# Patient Record
Sex: MALE | Race: WHITE | NOT HISPANIC OR LATINO | Employment: OTHER | URBAN - METROPOLITAN AREA
[De-identification: names, ages, dates, MRNs, and addresses within clinical notes are randomized per-mention and may not be internally consistent; named-entity substitution may affect disease eponyms.]

---

## 2024-01-31 ENCOUNTER — HOSPITAL ENCOUNTER (INPATIENT)
Facility: HOSPITAL | Age: 64
LOS: 2 days | Discharge: LEFT BEFORE DISCHARGE | DRG: 084 | End: 2024-02-02
Attending: EMERGENCY MEDICINE | Admitting: STUDENT IN AN ORGANIZED HEALTH CARE EDUCATION/TRAINING PROGRAM
Payer: MEDICARE

## 2024-01-31 DIAGNOSIS — S06.6X9A SUBARACHNOID HEMORRHAGE FOLLOWING INJURY, WITH LOSS OF CONSCIOUSNESS, INITIAL ENCOUNTER: Primary | ICD-10-CM

## 2024-01-31 DIAGNOSIS — F10.920 ALCOHOLIC INTOXICATION WITHOUT COMPLICATION: ICD-10-CM

## 2024-01-31 DIAGNOSIS — T14.90XA TRAUMA: ICD-10-CM

## 2024-01-31 DIAGNOSIS — R55 SYNCOPE: ICD-10-CM

## 2024-01-31 DIAGNOSIS — S02.2XXA CLOSED FRACTURE OF NASAL BONE, INITIAL ENCOUNTER: ICD-10-CM

## 2024-01-31 LAB
ABORH RETYPE: NORMAL
ALBUMIN SERPL-MCNC: 4.3 G/DL (ref 3.4–4.8)
ALBUMIN/GLOB SERPL: 1.3 RATIO (ref 1.1–2)
ALP SERPL-CCNC: 77 UNIT/L (ref 40–150)
ALT SERPL-CCNC: 13 UNIT/L (ref 0–55)
APTT PPP: 33.7 SECONDS (ref 23.2–33.7)
AST SERPL-CCNC: 26 UNIT/L (ref 5–34)
BASOPHILS # BLD AUTO: 0.09 X10(3)/MCL
BASOPHILS NFR BLD AUTO: 0.9 %
BILIRUB SERPL-MCNC: 0.3 MG/DL
BUN SERPL-MCNC: 7.2 MG/DL (ref 8.4–25.7)
CALCIUM SERPL-MCNC: 9 MG/DL (ref 8.8–10)
CHLORIDE SERPL-SCNC: 95 MMOL/L (ref 98–107)
CO2 SERPL-SCNC: 23 MMOL/L (ref 23–31)
CREAT SERPL-MCNC: 0.85 MG/DL (ref 0.73–1.18)
EOSINOPHIL # BLD AUTO: 0.24 X10(3)/MCL (ref 0–0.9)
EOSINOPHIL NFR BLD AUTO: 2.4 %
ERYTHROCYTE [DISTWIDTH] IN BLOOD BY AUTOMATED COUNT: 13.6 % (ref 11.5–17)
ETHANOL SERPL-MCNC: 325 MG/DL
GFR SERPLBLD CREATININE-BSD FMLA CKD-EPI: >60 MLS/MIN/1.73/M2
GLOBULIN SER-MCNC: 3.3 GM/DL (ref 2.4–3.5)
GLUCOSE SERPL-MCNC: 91 MG/DL (ref 82–115)
GROUP & RH: NORMAL
HCT VFR BLD AUTO: 44.3 % (ref 42–52)
HGB BLD-MCNC: 14.2 G/DL (ref 14–18)
IMM GRANULOCYTES # BLD AUTO: 0.03 X10(3)/MCL (ref 0–0.04)
IMM GRANULOCYTES NFR BLD AUTO: 0.3 %
INDIRECT COOMBS: NORMAL
INR PPP: 1
LACTATE SERPL-SCNC: 2.3 MMOL/L (ref 0.5–2.2)
LACTATE SERPL-SCNC: 2.5 MMOL/L (ref 0.5–2.2)
LYMPHOCYTES # BLD AUTO: 3.5 X10(3)/MCL (ref 0.6–4.6)
LYMPHOCYTES NFR BLD AUTO: 35.2 %
MCH RBC QN AUTO: 27.6 PG (ref 27–31)
MCHC RBC AUTO-ENTMCNC: 32.1 G/DL (ref 33–36)
MCV RBC AUTO: 86 FL (ref 80–94)
MONOCYTES # BLD AUTO: 1.15 X10(3)/MCL (ref 0.1–1.3)
MONOCYTES NFR BLD AUTO: 11.6 %
NEUTROPHILS # BLD AUTO: 4.93 X10(3)/MCL (ref 2.1–9.2)
NEUTROPHILS NFR BLD AUTO: 49.6 %
NRBC BLD AUTO-RTO: 0 %
PLATELET # BLD AUTO: 286 X10(3)/MCL (ref 130–400)
PMV BLD AUTO: 9.1 FL (ref 7.4–10.4)
POTASSIUM SERPL-SCNC: 4.1 MMOL/L (ref 3.5–5.1)
PROT SERPL-MCNC: 7.6 GM/DL (ref 5.8–7.6)
PROTHROMBIN TIME: 13.3 SECONDS (ref 12.5–14.5)
RBC # BLD AUTO: 5.15 X10(6)/MCL (ref 4.7–6.1)
SODIUM SERPL-SCNC: 132 MMOL/L (ref 136–145)
SPECIMEN OUTDATE: NORMAL
WBC # SPEC AUTO: 9.94 X10(3)/MCL (ref 4.5–11.5)

## 2024-01-31 PROCEDURE — 63600175 PHARM REV CODE 636 W HCPCS

## 2024-01-31 PROCEDURE — 90471 IMMUNIZATION ADMIN: CPT | Performed by: EMERGENCY MEDICINE

## 2024-01-31 PROCEDURE — 25500020 PHARM REV CODE 255: Performed by: EMERGENCY MEDICINE

## 2024-01-31 PROCEDURE — 96372 THER/PROPH/DIAG INJ SC/IM: CPT | Performed by: EMERGENCY MEDICINE

## 2024-01-31 PROCEDURE — 96375 TX/PRO/DX INJ NEW DRUG ADDON: CPT

## 2024-01-31 PROCEDURE — G0390 TRAUMA RESPONS W/HOSP CRITI: HCPCS

## 2024-01-31 PROCEDURE — 11000001 HC ACUTE MED/SURG PRIVATE ROOM

## 2024-01-31 PROCEDURE — 96374 THER/PROPH/DIAG INJ IV PUSH: CPT

## 2024-01-31 PROCEDURE — 86850 RBC ANTIBODY SCREEN: CPT | Performed by: EMERGENCY MEDICINE

## 2024-01-31 PROCEDURE — 80053 COMPREHEN METABOLIC PANEL: CPT | Performed by: EMERGENCY MEDICINE

## 2024-01-31 PROCEDURE — 96361 HYDRATE IV INFUSION ADD-ON: CPT

## 2024-01-31 PROCEDURE — 3E0234Z INTRODUCTION OF SERUM, TOXOID AND VACCINE INTO MUSCLE, PERCUTANEOUS APPROACH: ICD-10-PCS

## 2024-01-31 PROCEDURE — 85730 THROMBOPLASTIN TIME PARTIAL: CPT | Performed by: EMERGENCY MEDICINE

## 2024-01-31 PROCEDURE — 83605 ASSAY OF LACTIC ACID: CPT | Performed by: EMERGENCY MEDICINE

## 2024-01-31 PROCEDURE — 63600175 PHARM REV CODE 636 W HCPCS: Performed by: EMERGENCY MEDICINE

## 2024-01-31 PROCEDURE — 99291 CRITICAL CARE FIRST HOUR: CPT

## 2024-01-31 PROCEDURE — 85610 PROTHROMBIN TIME: CPT | Performed by: EMERGENCY MEDICINE

## 2024-01-31 PROCEDURE — 84484 ASSAY OF TROPONIN QUANT: CPT

## 2024-01-31 PROCEDURE — 25000003 PHARM REV CODE 250

## 2024-01-31 PROCEDURE — 90715 TDAP VACCINE 7 YRS/> IM: CPT | Performed by: EMERGENCY MEDICINE

## 2024-01-31 PROCEDURE — 82077 ASSAY SPEC XCP UR&BREATH IA: CPT | Performed by: EMERGENCY MEDICINE

## 2024-01-31 PROCEDURE — 85025 COMPLETE CBC W/AUTO DIFF WBC: CPT | Performed by: EMERGENCY MEDICINE

## 2024-01-31 RX ORDER — TALC
6 POWDER (GRAM) TOPICAL NIGHTLY PRN
Status: DISCONTINUED | OUTPATIENT
Start: 2024-01-31 | End: 2024-02-02 | Stop reason: HOSPADM

## 2024-01-31 RX ORDER — ADHESIVE BANDAGE
30 BANDAGE TOPICAL DAILY PRN
Status: DISCONTINUED | OUTPATIENT
Start: 2024-01-31 | End: 2024-02-02 | Stop reason: HOSPADM

## 2024-01-31 RX ORDER — ACETAMINOPHEN 325 MG/1
650 TABLET ORAL EVERY 4 HOURS
Status: DISCONTINUED | OUTPATIENT
Start: 2024-01-31 | End: 2024-02-02 | Stop reason: HOSPADM

## 2024-01-31 RX ORDER — HALOPERIDOL 5 MG/ML
INJECTION INTRAMUSCULAR
Status: COMPLETED
Start: 2024-01-31 | End: 2024-01-31

## 2024-01-31 RX ORDER — FOLIC ACID 1 MG/1
1 TABLET ORAL DAILY
Status: DISCONTINUED | OUTPATIENT
Start: 2024-02-01 | End: 2024-02-02 | Stop reason: HOSPADM

## 2024-01-31 RX ORDER — LORAZEPAM 1 MG/1
1 TABLET ORAL EVERY 4 HOURS PRN
Status: DISCONTINUED | OUTPATIENT
Start: 2024-01-31 | End: 2024-02-02 | Stop reason: HOSPADM

## 2024-01-31 RX ORDER — SODIUM CHLORIDE 9 MG/ML
INJECTION, SOLUTION INTRAVENOUS CONTINUOUS
Status: DISCONTINUED | OUTPATIENT
Start: 2024-01-31 | End: 2024-01-31

## 2024-01-31 RX ORDER — SODIUM CHLORIDE, SODIUM LACTATE, POTASSIUM CHLORIDE, CALCIUM CHLORIDE 600; 310; 30; 20 MG/100ML; MG/100ML; MG/100ML; MG/100ML
INJECTION, SOLUTION INTRAVENOUS
Status: COMPLETED | OUTPATIENT
Start: 2024-01-31 | End: 2024-01-31

## 2024-01-31 RX ORDER — HALOPERIDOL 5 MG/ML
5 INJECTION INTRAMUSCULAR
Status: COMPLETED | OUTPATIENT
Start: 2024-01-31 | End: 2024-01-31

## 2024-01-31 RX ORDER — OXYCODONE HYDROCHLORIDE 5 MG/1
5 TABLET ORAL EVERY 4 HOURS PRN
Status: DISCONTINUED | OUTPATIENT
Start: 2024-01-31 | End: 2024-02-02 | Stop reason: HOSPADM

## 2024-01-31 RX ORDER — POLYETHYLENE GLYCOL 3350 17 G/17G
17 POWDER, FOR SOLUTION ORAL 2 TIMES DAILY
Status: DISCONTINUED | OUTPATIENT
Start: 2024-01-31 | End: 2024-02-02 | Stop reason: HOSPADM

## 2024-01-31 RX ORDER — CEFAZOLIN SODIUM 2 G/50ML
SOLUTION INTRAVENOUS
Status: COMPLETED | OUTPATIENT
Start: 2024-01-31 | End: 2024-01-31

## 2024-01-31 RX ORDER — CEFAZOLIN SODIUM 1 G/3ML
INJECTION, POWDER, FOR SOLUTION INTRAMUSCULAR; INTRAVENOUS
Status: DISPENSED
Start: 2024-01-31 | End: 2024-02-01

## 2024-01-31 RX ORDER — THIAMINE HCL 100 MG
100 TABLET ORAL DAILY
Status: DISCONTINUED | OUTPATIENT
Start: 2024-02-01 | End: 2024-02-02 | Stop reason: HOSPADM

## 2024-01-31 RX ORDER — ONDANSETRON HYDROCHLORIDE 2 MG/ML
INJECTION, SOLUTION INTRAVENOUS CODE/TRAUMA/SEDATION MEDICATION
Status: COMPLETED | OUTPATIENT
Start: 2024-01-31 | End: 2024-01-31

## 2024-01-31 RX ORDER — METHOCARBAMOL 500 MG/1
500 TABLET, FILM COATED ORAL EVERY 8 HOURS
Status: DISCONTINUED | OUTPATIENT
Start: 2024-01-31 | End: 2024-02-02 | Stop reason: HOSPADM

## 2024-01-31 RX ORDER — ONDANSETRON HYDROCHLORIDE 2 MG/ML
INJECTION, SOLUTION INTRAVENOUS
Status: DISPENSED
Start: 2024-01-31 | End: 2024-02-01

## 2024-01-31 RX ORDER — DOCUSATE SODIUM 100 MG/1
100 CAPSULE, LIQUID FILLED ORAL 2 TIMES DAILY
Status: DISCONTINUED | OUTPATIENT
Start: 2024-01-31 | End: 2024-02-02 | Stop reason: HOSPADM

## 2024-01-31 RX ORDER — SODIUM CHLORIDE 9 MG/ML
INJECTION, SOLUTION INTRAVENOUS CONTINUOUS
Status: DISCONTINUED | OUTPATIENT
Start: 2024-02-01 | End: 2024-02-01

## 2024-01-31 RX ADMIN — CEFAZOLIN SODIUM 2000 MG: 2 SOLUTION INTRAVENOUS at 06:01

## 2024-01-31 RX ADMIN — HALOPERIDOL LACTATE 5 MG: 5 INJECTION, SOLUTION INTRAMUSCULAR at 07:01

## 2024-01-31 RX ADMIN — SODIUM CHLORIDE, POTASSIUM CHLORIDE, SODIUM LACTATE AND CALCIUM CHLORIDE 1000 ML: 600; 310; 30; 20 INJECTION, SOLUTION INTRAVENOUS at 06:01

## 2024-01-31 RX ADMIN — HALOPERIDOL LACTATE: 5 INJECTION, SOLUTION INTRAMUSCULAR at 07:01

## 2024-01-31 RX ADMIN — ONDANSETRON 4 MG: 2 INJECTION INTRAMUSCULAR; INTRAVENOUS at 06:01

## 2024-01-31 RX ADMIN — FOLIC ACID: 5 INJECTION, SOLUTION INTRAMUSCULAR; INTRAVENOUS; SUBCUTANEOUS at 10:01

## 2024-01-31 RX ADMIN — IOPAMIDOL 100 ML: 755 INJECTION, SOLUTION INTRAVENOUS at 07:01

## 2024-01-31 RX ADMIN — TETANUS TOXOID, REDUCED DIPHTHERIA TOXOID AND ACELLULAR PERTUSSIS VACCINE, ADSORBED 0.5 ML: 5; 2.5; 8; 8; 2.5 SUSPENSION INTRAMUSCULAR at 07:01

## 2024-02-01 PROBLEM — S02.2XXA CLOSED FRACTURE OF NASAL BONE: Status: ACTIVE | Noted: 2024-02-01

## 2024-02-01 PROBLEM — S06.2X9A: Status: ACTIVE | Noted: 2024-02-01

## 2024-02-01 PROBLEM — F10.10 ETOH ABUSE: Status: ACTIVE | Noted: 2024-02-01

## 2024-02-01 PROBLEM — S05.10XA ORBITAL CONTUSION: Status: ACTIVE | Noted: 2024-02-01
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
## 2024-02-01 LAB
ALBUMIN SERPL-MCNC: 4 G/DL (ref 3.4–4.8)
ALBUMIN/GLOB SERPL: 1.3 RATIO (ref 1.1–2)
ALP SERPL-CCNC: 80 UNIT/L (ref 40–150)
ALT SERPL-CCNC: 12 UNIT/L (ref 0–55)
AMPHET UR QL SCN: NEGATIVE
APPEARANCE UR: CLEAR
AST SERPL-CCNC: 36 UNIT/L (ref 5–34)
BACTERIA #/AREA URNS AUTO: NORMAL /HPF
BARBITURATE SCN PRESENT UR: NEGATIVE
BASOPHILS # BLD AUTO: 0.06 X10(3)/MCL
BASOPHILS NFR BLD AUTO: 0.5 %
BENZODIAZ UR QL SCN: NEGATIVE
BILIRUB SERPL-MCNC: 0.4 MG/DL
BILIRUB UR QL STRIP.AUTO: NEGATIVE
BUN SERPL-MCNC: 5.3 MG/DL (ref 8.4–25.7)
CALCIUM SERPL-MCNC: 8.8 MG/DL (ref 8.8–10)
CANNABINOIDS UR QL SCN: POSITIVE
CHLORIDE SERPL-SCNC: 99 MMOL/L (ref 98–107)
CK SERPL-CCNC: 1085 U/L (ref 30–200)
CK SERPL-CCNC: 789 U/L (ref 30–200)
CK SERPL-CCNC: 967 U/L (ref 30–200)
CO2 SERPL-SCNC: 17 MMOL/L (ref 23–31)
COCAINE UR QL SCN: NEGATIVE
COLOR UR AUTO: COLORLESS
CREAT SERPL-MCNC: 0.77 MG/DL (ref 0.73–1.18)
CRP SERPL-MCNC: 1.7 MG/L
EOSINOPHIL # BLD AUTO: 0.1 X10(3)/MCL (ref 0–0.9)
EOSINOPHIL NFR BLD AUTO: 0.8 %
ERYTHROCYTE [DISTWIDTH] IN BLOOD BY AUTOMATED COUNT: 13.5 % (ref 11.5–17)
FENTANYL UR QL SCN: NEGATIVE
GFR SERPLBLD CREATININE-BSD FMLA CKD-EPI: >60 MLS/MIN/1.73/M2
GLOBULIN SER-MCNC: 3.2 GM/DL (ref 2.4–3.5)
GLUCOSE SERPL-MCNC: 105 MG/DL (ref 82–115)
GLUCOSE UR QL STRIP.AUTO: NORMAL
HCT VFR BLD AUTO: 44.8 % (ref 42–52)
HGB BLD-MCNC: 14.9 G/DL (ref 14–18)
IMM GRANULOCYTES # BLD AUTO: 0.05 X10(3)/MCL (ref 0–0.04)
IMM GRANULOCYTES NFR BLD AUTO: 0.4 %
KETONES UR QL STRIP.AUTO: NEGATIVE
LACTATE SERPL-SCNC: 0.9 MMOL/L (ref 0.5–2.2)
LACTATE SERPL-SCNC: 2 MMOL/L (ref 0.5–2.2)
LACTATE SERPL-SCNC: 3.9 MMOL/L (ref 0.5–2.2)
LACTATE SERPL-SCNC: 4.5 MMOL/L (ref 0.5–2.2)
LEUKOCYTE ESTERASE UR QL STRIP.AUTO: NEGATIVE
LYMPHOCYTES # BLD AUTO: 1.95 X10(3)/MCL (ref 0.6–4.6)
LYMPHOCYTES NFR BLD AUTO: 15.8 %
MAGNESIUM SERPL-MCNC: 1.8 MG/DL (ref 1.6–2.6)
MCH RBC QN AUTO: 28.1 PG (ref 27–31)
MCHC RBC AUTO-ENTMCNC: 33.3 G/DL (ref 33–36)
MCV RBC AUTO: 84.5 FL (ref 80–94)
MDMA UR QL SCN: NEGATIVE
MONOCYTES # BLD AUTO: 0.7 X10(3)/MCL (ref 0.1–1.3)
MONOCYTES NFR BLD AUTO: 5.7 %
NEUTROPHILS # BLD AUTO: 9.45 X10(3)/MCL (ref 2.1–9.2)
NEUTROPHILS NFR BLD AUTO: 76.8 %
NITRITE UR QL STRIP.AUTO: NEGATIVE
NRBC BLD AUTO-RTO: 0 %
OPIATES UR QL SCN: NEGATIVE
PCP UR QL: NEGATIVE
PH UR STRIP.AUTO: 6.5 [PH]
PH UR: 6.5 [PH] (ref 3–11)
PHOSPHATE SERPL-MCNC: 2.7 MG/DL (ref 2.3–4.7)
PLATELET # BLD AUTO: 240 X10(3)/MCL (ref 130–400)
PMV BLD AUTO: 9.1 FL (ref 7.4–10.4)
POTASSIUM SERPL-SCNC: 4.1 MMOL/L (ref 3.5–5.1)
PREALB SERPL-MCNC: 26 MG/DL (ref 16–42)
PROT SERPL-MCNC: 7.2 GM/DL (ref 5.8–7.6)
PROT UR QL STRIP.AUTO: NEGATIVE
RBC # BLD AUTO: 5.3 X10(6)/MCL (ref 4.7–6.1)
RBC #/AREA URNS AUTO: NORMAL /HPF
RBC UR QL AUTO: NEGATIVE
SODIUM SERPL-SCNC: 131 MMOL/L (ref 136–145)
SP GR UR STRIP.AUTO: 1.01 (ref 1–1.03)
SPECIFIC GRAVITY, URINE AUTO (.000) (OHS): 1.01 (ref 1–1.03)
SQUAMOUS #/AREA URNS LPF: NORMAL /HPF
TROPONIN I SERPL-MCNC: <0.01 NG/ML (ref 0–0.04)
UROBILINOGEN UR STRIP-ACNC: NORMAL
WBC # SPEC AUTO: 12.31 X10(3)/MCL (ref 4.5–11.5)
WBC #/AREA URNS AUTO: NORMAL /HPF

## 2024-02-01 PROCEDURE — 86140 C-REACTIVE PROTEIN: CPT

## 2024-02-01 PROCEDURE — 84100 ASSAY OF PHOSPHORUS: CPT

## 2024-02-01 PROCEDURE — 63600175 PHARM REV CODE 636 W HCPCS

## 2024-02-01 PROCEDURE — 11000001 HC ACUTE MED/SURG PRIVATE ROOM

## 2024-02-01 PROCEDURE — 25000003 PHARM REV CODE 250: Performed by: STUDENT IN AN ORGANIZED HEALTH CARE EDUCATION/TRAINING PROGRAM

## 2024-02-01 PROCEDURE — 92610 EVALUATE SWALLOWING FUNCTION: CPT

## 2024-02-01 PROCEDURE — 83735 ASSAY OF MAGNESIUM: CPT

## 2024-02-01 PROCEDURE — 85025 COMPLETE CBC W/AUTO DIFF WBC: CPT

## 2024-02-01 PROCEDURE — 84134 ASSAY OF PREALBUMIN: CPT

## 2024-02-01 PROCEDURE — 80053 COMPREHEN METABOLIC PANEL: CPT

## 2024-02-01 PROCEDURE — 80307 DRUG TEST PRSMV CHEM ANLYZR: CPT | Performed by: EMERGENCY MEDICINE

## 2024-02-01 PROCEDURE — 25000003 PHARM REV CODE 250

## 2024-02-01 PROCEDURE — 82550 ASSAY OF CK (CPK): CPT | Performed by: NURSE PRACTITIONER

## 2024-02-01 PROCEDURE — 81001 URINALYSIS AUTO W/SCOPE: CPT | Performed by: EMERGENCY MEDICINE

## 2024-02-01 PROCEDURE — 83605 ASSAY OF LACTIC ACID: CPT

## 2024-02-01 PROCEDURE — 99223 1ST HOSP IP/OBS HIGH 75: CPT | Mod: FS,,, | Performed by: STUDENT IN AN ORGANIZED HEALTH CARE EDUCATION/TRAINING PROGRAM

## 2024-02-01 RX ORDER — SODIUM CHLORIDE 9 MG/ML
INJECTION, SOLUTION INTRAVENOUS CONTINUOUS
Status: CANCELLED | OUTPATIENT
Start: 2024-02-01

## 2024-02-01 RX ORDER — HYDRALAZINE HYDROCHLORIDE 20 MG/ML
10 INJECTION INTRAMUSCULAR; INTRAVENOUS EVERY 4 HOURS PRN
Status: DISCONTINUED | OUTPATIENT
Start: 2024-02-01 | End: 2024-02-02 | Stop reason: HOSPADM

## 2024-02-01 RX ORDER — HYDRALAZINE HYDROCHLORIDE 20 MG/ML
10 INJECTION INTRAMUSCULAR; INTRAVENOUS EVERY 4 HOURS PRN
Status: DISCONTINUED | OUTPATIENT
Start: 2024-02-01 | End: 2024-02-01

## 2024-02-01 RX ORDER — LABETALOL HYDROCHLORIDE 5 MG/ML
10 INJECTION, SOLUTION INTRAVENOUS EVERY 4 HOURS PRN
Status: DISCONTINUED | OUTPATIENT
Start: 2024-02-01 | End: 2024-02-01

## 2024-02-01 RX ORDER — ENALAPRILAT 1.25 MG/ML
1.25 INJECTION INTRAVENOUS ONCE
Status: COMPLETED | OUTPATIENT
Start: 2024-02-01 | End: 2024-02-01

## 2024-02-01 RX ORDER — SODIUM CHLORIDE 9 MG/ML
INJECTION, SOLUTION INTRAVENOUS CONTINUOUS
Status: DISCONTINUED | OUTPATIENT
Start: 2024-02-01 | End: 2024-02-02 | Stop reason: HOSPADM

## 2024-02-01 RX ORDER — LABETALOL HYDROCHLORIDE 5 MG/ML
10 INJECTION, SOLUTION INTRAVENOUS EVERY 4 HOURS PRN
Status: DISCONTINUED | OUTPATIENT
Start: 2024-02-01 | End: 2024-02-02 | Stop reason: HOSPADM

## 2024-02-01 RX ADMIN — METHOCARBAMOL 500 MG: 500 TABLET ORAL at 06:02

## 2024-02-01 RX ADMIN — ENALAPRILAT 1.25 MG: 2.5 INJECTION INTRAVENOUS at 06:02

## 2024-02-01 RX ADMIN — FOLIC ACID 1 MG: 1 TABLET ORAL at 09:02

## 2024-02-01 RX ADMIN — POLYETHYLENE GLYCOL 3350 17 G: 17 POWDER, FOR SOLUTION ORAL at 09:02

## 2024-02-01 RX ADMIN — LORAZEPAM 1 MG: 1 TABLET ORAL at 03:02

## 2024-02-01 RX ADMIN — SODIUM CHLORIDE: 9 INJECTION, SOLUTION INTRAVENOUS at 11:02

## 2024-02-01 RX ADMIN — THERA TABS 1 TABLET: TAB at 09:02

## 2024-02-01 RX ADMIN — LABETALOL HYDROCHLORIDE 10 MG: 5 INJECTION, SOLUTION INTRAVENOUS at 03:02

## 2024-02-01 RX ADMIN — LABETALOL HYDROCHLORIDE 10 MG: 5 INJECTION, SOLUTION INTRAVENOUS at 11:02

## 2024-02-01 RX ADMIN — Medication 6 MG: at 09:02

## 2024-02-01 RX ADMIN — HYDRALAZINE HYDROCHLORIDE 10 MG: 20 INJECTION INTRAMUSCULAR; INTRAVENOUS at 10:02

## 2024-02-01 RX ADMIN — ACETAMINOPHEN 325MG 650 MG: 325 TABLET ORAL at 09:02

## 2024-02-01 RX ADMIN — ACETAMINOPHEN 325MG 650 MG: 325 TABLET ORAL at 05:02

## 2024-02-01 RX ADMIN — LORAZEPAM 1 MG: 1 TABLET ORAL at 04:02

## 2024-02-01 RX ADMIN — HYDRALAZINE HYDROCHLORIDE 10 MG: 20 INJECTION INTRAMUSCULAR; INTRAVENOUS at 02:02

## 2024-02-01 RX ADMIN — SODIUM CHLORIDE 1000 ML: 9 INJECTION, SOLUTION INTRAVENOUS at 06:02

## 2024-02-01 RX ADMIN — DOCUSATE SODIUM 100 MG: 100 CAPSULE, LIQUID FILLED ORAL at 09:02

## 2024-02-01 RX ADMIN — METHOCARBAMOL 500 MG: 500 TABLET ORAL at 02:02

## 2024-02-01 RX ADMIN — ACETAMINOPHEN 325MG 650 MG: 325 TABLET ORAL at 02:02

## 2024-02-01 RX ADMIN — ACETAMINOPHEN 325MG 650 MG: 325 TABLET ORAL at 06:02

## 2024-02-01 RX ADMIN — THIAMINE HCL TAB 100 MG 100 MG: 100 TAB at 09:02

## 2024-02-01 RX ADMIN — METHOCARBAMOL 500 MG: 500 TABLET ORAL at 09:02

## 2024-02-01 RX ADMIN — SODIUM CHLORIDE 500 ML: 9 INJECTION, SOLUTION INTRAVENOUS at 01:02

## 2024-02-01 NOTE — NURSING
Nurses Note -- 4 Eyes      2/1/2024   3:54 PM      Skin assessed during: Transfer      [x] No Altered Skin Integrity Present    []Prevention Measures Documented      [] Yes- Altered Skin Integrity Present or Discovered   [] LDA Added if Not in Epic (Describe Wound)   [] New Altered Skin Integrity was Present on Admit and Documented in LDA   [] Wound Image Taken    Wound Care Consulted? No    Attending Nurse:  Maryann Posada RN/Staff Member:   Sasha Dumont RN

## 2024-02-01 NOTE — ED NOTES
Received report from DIANA Fisher. Pt activated as a trauma after being found unresponsive in a driveway. C-collar in place, roll belt in place d/t patient disoriented to situation and attempting to remove collar and get out of bed. Pt disoriented, agitated. Lac and hematoma noted to right eye.

## 2024-02-01 NOTE — H&P
Trauma Surgery   History and Physical    Patient Name: Beverly Salcido  MRN: [id number]   YOB: 1961  Date: 01/31/2024    LEVEL 2 TRAUMA     Subjective:   History of present illness: Patient is an approximately 63 year old male who presents via EMS after being found down in a driveway with contusion to right eye and heavy odor of alcohol present. Patient mumbling in trauma bay, unable to obtain history.    I was present bedside in ED at 1850.    Primary Survey:  A Clear, intact   B Unlabored, slight wheezing to lung sounds bilaterally   C No signs of uncontrolled external hemorrhage, 2+ radial and DP pulses    D GCS 14(E 4, V 4, M 6)    E exposed, log-rolled and examined (see below)   F See below     VITAL SIGNS: 24 HR MIN & MAX LAST   No data recorded      No data recorded       No data recorded       No data recorded       No data recorded         HT:    WT:    BMI:       FAST: negative for free fluid    Medications/transfusions received en-route: None  Medications/transfusions received in trauma bay: Tdap, ancef, haldol, 1L     Scheduled Meds:   ceFAZolin        haloperidol lactate        ondansetron         Continuous Infusions:   ceFAZolin (Ancef) IV (PEDS and ADULTS) 2,000 mg (01/31/24 1857)    lactated ringers 1,000 mL (01/31/24 1856)     PRN Meds:ceFAZolin, ceFAZolin (Ancef) IV (PEDS and ADULTS), haloperidol lactate, lactated ringers, ondansetron, ondansetron    ROS: unable to assess secondary to patients condition     Allergies: Unknown  PMH: Unknown  PSH: Unknown  Social history: Unknown  Objective:   Secondary Survey:   General: Well developed, well nourished  Neuro: CNII-XII grossly intact, GCS 14  HEENT:  Normocephalic, abrasion and contusion to right orbit, PERRL, cervical collar in place  CV:  RRR  Pulse: 2+ RP b/l, 2+ DP b/l   Resp/chest:  Non-labored breathing, satting on room air  GI:  Abdomen soft, non-tender, non-distended  :  Deferred  Rectal: Deferred   Extremities: Moves  all 4 spontaneously and purposefully, no obvious gross deformities.  Back/Spine: No bony TTP, no palpable step offs or deformities.  Skin/wounds:  Warm, well perfused, abrasion/skin tear right elbow    Labs:  Na 132  LA 2.3  Alcohol 325    Imaging:  CXR: NO ACUTE CARDIOPULMONARY PROCESS IDENTIFIED.     Pelvis XR: No acute osseous abnormality identified.     Right elbow XR: No acute fracture per my read.     CT Head: Right frontal lobe hemorrhagic contusion.     CT Face: Right periorbital soft tissue inflammations/laceration. No acute fracture of the orbital walls identified. Focal fracture deformity of the right nasal bone.     CT C-spine: No acute fracture or malalignment identified.     CT CAP: No traumatic injury of the thorax, abdomen or pelvis identified.       Assessment & Plan:   Patient is a 63 year old male who was found down with signs of head trauma. Labs and imaging significant for frontal lobe hemorrhagic contusions and elevated alcohol level. EM physician discussed with Neurosurgery who advised OK for floor admission with repeat CT head in AM.     Right frontal lobe hemorrhagic contusions  - Consult to Neurosurgery  - Repeat CT head in AM  - Q2H neuro checks    Alcohol Abuse  - Daily multivitamin, thiamine and folic acid  - Banana bag x 1  - CIWA monitoring; Ativan prn    Trauma   - MMPC  - Local wound care to abrasions with BID and as needed dressing changes   - SCDs for VTE ppx in setting of intracranial bleeding  - Fall and standard precuations  - Plan to clear c-collar once sober   - NPO pending repeat CT head    CARLYN ToribioP-BC, FNP-BC  Trauma Surgery  [facility]  C: [phone]

## 2024-02-01 NOTE — PROGRESS NOTES
"   Trauma Surgery   Progress Note  Admit Date: 1/31/2024  HD#1  POD#* No surgery found *    Subjective:   Interval history:  NAEON, AF, labile BP. Seen by neuro, no acute surgical intervention.     Home Meds: No current outpatient medications   Scheduled Meds:   acetaminophen  650 mg Oral Q4H    docusate sodium  100 mg Oral BID    folic acid  1 mg Oral Daily    methocarbamoL  500 mg Oral Q8H    multivitamin  1 tablet Oral Daily    polyethylene glycol  17 g Oral BID    thiamine  100 mg Oral Daily     Continuous Infusions:   sodium chloride 0.9% 150 mL/hr at 02/01/24 1134     PRN Meds:hydrALAZINE, labetaloL, LORazepam, magnesium hydroxide 400 mg/5 ml, melatonin, oxyCODONE     Objective:     VITAL SIGNS: 24 HR MIN & MAX LAST   Temp  Min: 97.5 °F (36.4 °C)  Max: 98.7 °F (37.1 °C)  98.7 °F (37.1 °C)   BP  Min: 132/74  Max: 191/103  (!) 156/85    Pulse  Min: 72  Max: 90  79    Resp  Min: 18  Max: 24  (!) 24    SpO2  Min: 94 %  Max: 100 %  99 %      HT: 5' 6" (167.6 cm)  WT: 56.7 kg (125 lb)  BMI: 20.2     Intake/output:  Intake/Output - Last 3 Shifts         01/30 0700  01/31 0659 01/31 0700  02/01 0659 02/01 0700  02/02 0659    Urine (mL/kg/hr)   725 (2)    Total Output   725    Net   -725                   Intake/Output Summary (Last 24 hours) at 2/1/2024 1321  Last data filed at 2/1/2024 1221  Gross per 24 hour   Intake --   Output 725 ml   Net -725 ml         Lines/drains/airway:       Peripheral IV - Single Lumen 01/31/24 1853 18 G Left Antecubital (Active)   Site Assessment Clean;Dry;Intact 01/31/24 1853   Number of days: 0       Physical examination:  Gen: NAD, answering questions appropriately  HEENT: Right periorbital ecchymosis  CV: RR  Resp: NWOB  Abd: S/NT/ND  Msk: moving all extremities spontaneously and purposefully  Neuro: CN II-XII grossly intact  Skin/wounds: clean, dry, right elbow abrasion.     Labs:  Renal:  Recent Labs     01/31/24  1854 02/01/24  0351   BUN 7.2* 5.3*   CREATININE 0.85 0.77     Recent " "Labs     01/31/24 1854 01/31/24  2140 02/01/24  0325 02/01/24  1014   LACTIC 2.3* 2.5* 4.5* 3.9*     FEN/GI:  Recent Labs     01/31/24 1854 02/01/24  0351   * 131*   K 4.1 4.1   CO2 23 17*   CALCIUM 9.0 8.8   MG  --  1.80   PHOS  --  2.7   ALBUMIN 4.3 4.0   BILITOT 0.3 0.4   AST 26 36*   ALKPHOS 77 80   ALT 13 12     Heme:  Recent Labs     01/31/24 1854 01/31/24 1855 02/01/24  0351   HGB 14.2  --  14.9   HCT 44.3  --  44.8     --  240   PTT  --  33.7  --    INR  --  1.0  --      ID:  Recent Labs     01/31/24 1854 02/01/24  0351   WBC 9.94 12.31*     CBG:  Recent Labs     01/31/24 1854 02/01/24  0351   GLUCOSE 91 105      No results for input(s): "POCTGLUCOSE" in the last 72 hours.   Cardiovascular:  Recent Labs   Lab 01/31/24 1854   TROPONINI <0.010     I have reviewed all pertinent lab results within the past 24 hours.    Imaging:  CT Head Without Contrast   Final Result   Impression:      1. There is a subcortical hemorrhagic contusion in the right anterior frontal frontal lobe, which measures 10 x 6 x 7 mm (series 2, images 44). Minimal perifocal edema is present. This is stable compared to the prior study.      2. Details and other findings as noted above.      No significant discrepancy with overnight report         Electronically signed by: Uziel Aguilar   Date:    02/01/2024   Time:    07:45      X-Ray Elbow Complete Right   ED Interpretation   NO FRACTURE NO SUBLUXATION      Final Result      No acute osseous abnormality identified.         Electronically signed by: Uziel Aguilar   Date:    01/31/2024   Time:    22:40      CT Chest Abdomen Pelvis With IV Contrast (XPD) NO Oral Contrast   Final Result      1. No traumatic injury of the thorax, abdomen or pelvis identified.      2.  Details of the findings above.         Electronically signed by: Uziel Aguilar   Date:    01/31/2024   Time:    20:18      CT Maxillofacial Without Contrast   Final Result      1. Right periorbital soft tissue " inflammations/laceration.      2. No acute fracture of the orbital walls identified.      3. Focal fracture deformity of the right nasal bone.         Electronically signed by: Uziel Aguilar   Date:    01/31/2024   Time:    20:08      CT Cervical Spine Without Contrast   Final Result      No acute fracture or malalignment identified.      Degenerative changes.         Electronically signed by: Uziel Aguilar   Date:    01/31/2024   Time:    20:11      CT Head Without Contrast   Final Result      Right frontal lobe hemorrhagic contusion.      Findings were notified to Dr. Fatimah Paula January 31, 2024 at 20:18 hours         Electronically signed by: Uziel Aguilar   Date:    01/31/2024   Time:    20:18      X-Ray Pelvis Routine AP   Final Result      No acute osseous abnormality identified.         Electronically signed by: Uziel Aguilar   Date:    01/31/2024   Time:    19:06      X-Ray Chest 1 View   Final Result      NO ACUTE CARDIOPULMONARY PROCESS IDENTIFIED.         Electronically signed by: Uziel Heahim   Date:    01/31/2024   Time:    19:05         I have reviewed all pertinent imaging results/findings within the past 24 hours.    Micro/Path/Other:  Microbiology Results (last 7 days)       ** No results found for the last 168 hours. **           Specimen (168h ago, onward)      None             Problems list:  There are no problems to display for this patient.       Assessment & Plan:   Patient is a 63 year old male who was found down with signs of head trauma. Labs and imaging significant for frontal lobe hemorrhagic contusions and elevated alcohol level.      Right frontal lobe hemorrhagic contusions  - No surgical intervention per NSGY  - CT Head stable  - Q2H neuro checks     Alcohol Abuse  - Daily multivitamin, thiamine and folic acid  - Banana bag x 1  - CIWA monitoring; Ativan prn     Trauma   - MMPC  - Local wound care to abrasions with BID and as needed dressing changes   - SCDs for VTE ppx in setting of  intracranial bleeding  - Fall and standard precautions

## 2024-02-01 NOTE — PLAN OF CARE
Met with patient to conduct initial cm dc planning assessment and BRIEF TRAUMA INTERVENTION. Pt is , no children, no living will or POA. No PCP. Pt reported drinking 4 times a week, 3-4 drinks. Pt declined substance abuse resources at this time.    02/01/24 0945   Discharge Assessment   Assessment Type Discharge Planning Assessment   Confirmed/corrected address, phone number and insurance Yes   Confirmed Demographics Correct on Facesheet   Source of Information patient   When was your last doctors appointment?   (No PCP)   Communicated JACQUELINE with patient/caregiver Date not available/Unable to determine   People in Home alone  (Pt reports living alone in [place] but currently staying with his father on [facility].)   Do you expect to return to your current living situation? Other (see comments)  (Pt reports living alone in [place] but currently staying with his father on [facility].)   Do you have help at home or someone to help you manage your care at home? Yes   Who are your caregiver(s) and their phone number(s)? father, Gene [phone]   Prior to hospitilization cognitive status: Unable to Assess   Current cognitive status: Alert/Oriented   Walking or Climbing Stairs Difficulty no   Dressing/Bathing Difficulty no   Home Accessibility wheelchair accessible   Home Layout Able to live on 1st floor   Equipment Currently Used at Home none   Readmission within 30 days? No   Patient currently being followed by outpatient case management? No   Do you currently have service(s) that help you manage your care at home? No   Do you take prescription medications? No   Do you have prescription coverage? Yes   Coverage [employer]   Do you have any problems affording any of your prescribed medications? TBD   Is the patient taking medications as prescribed? yes   Who is going to help you get home at discharge? father   How do you get to doctors appointments? car, drives self   Are you on dialysis? No   Do you take  coumadin? No   Discharge Plan A Other  (TBD)   DME Needed Upon Discharge  other (see comments)  (TBD)   Discharge Plan discussed with: Patient   Transition of Care Barriers None   Physical Activity   On average, how many days per week do you engage in moderate to strenuous exercise (like a brisk walk)? 3 days   On average, how many minutes do you engage in exercise at this level? 60 min   Financial Resource Strain   How hard is it for you to pay for the very basics like food, housing, medical care, and heating? Not very   Housing Stability   In the last 12 months, was there a time when you were not able to pay the mortgage or rent on time? N   In the last 12 months, how many places have you lived? 2   In the last 12 months, was there a time when you did not have a steady place to sleep or slept in a shelter (including now)? N   Transportation Needs   In the past 12 months, has lack of transportation kept you from medical appointments or from getting medications? no   In the past 12 months, has lack of transportation kept you from meetings, work, or from getting things needed for daily living? No   Food Insecurity   Within the past 12 months, you worried that your food would run out before you got the money to buy more. Never true   Within the past 12 months, the food you bought just didn't last and you didn't have money to get more. Never true   Stress   Do you feel stress - tense, restless, nervous, or anxious, or unable to sleep at night because your mind is troubled all the time - these days? Not at all   Social Connections   In a typical week, how many times do you talk on the phone with family, friends, or neighbors? More than 3   How often do you get together with friends or relatives? More than 3   How often do you attend Jehovah's witness or Christian services? 1 to 4   Do you belong to any clubs or organizations such as Jehovah's witness groups, unions, fraternal or athletic groups, or school groups? No   How often do you attend  meetings of the clubs or organizations you belong to? Never   Are you , , , , never , or living with a partner?    Alcohol Use   Q1: How often do you have a drink containing alcohol? 4 or more ti   Q2: How many drinks containing alcohol do you have on a typical day when you are drinking? 3 or 4   Q3: How often do you have six or more drinks on one occasion? Less than mo

## 2024-02-01 NOTE — ED NOTES
Pt arrived via EMS,  EMS reports pt removed c collar himself and refused to let IV be started.  Pt has swelling to right eye and lac with bleeding controlled.  Strong smell of ETOH noted.  GCS 13

## 2024-02-01 NOTE — ED NOTES
C spine immobilized per RN, log roll performed with no step off or deformity noted.  No changes to neuro after log roll performed.

## 2024-02-01 NOTE — PT/OT/SLP EVAL
[facility]  Speech Language Pathology Department  Clinical Swallow Evaluation    Patient Name:  Osman Trent   MRN:  [id number]    Recommendations:     General recommendations:  SLP follow up x1 regarding diet tolerance and Speech/Language and Cognitive Evaluation  Diet texture/consistency recommendations:  Regular solids (IDDSI 7) and thin liquids (IDDSI 0)  Medications: per patient preference  Swallow strategies/precautions: upright for PO intake  Precautions: Standard,      History:     Osman Trent is a/n 63 y.o. male admitted after being found down in driveway. +etoh.  CT head revealing frontal lobe hemorrhagic contusion.    No past medical history on file.  No past surgical history on file.      Home diet texture/consistency: Regular and thin liquids  Current method of nutrition: NPO    Patient complaint: to eat/drink    Imaging   Results for orders placed during the hospital encounter of 01/31/24    X-Ray Chest 1 View    Narrative  EXAMINATION:  XR CHEST 1 VIEW    CLINICAL HISTORY:  r/o bleeding or hemorrhage;    TECHNIQUE:  One view    COMPARISON:  None available.    FINDINGS:  Cardiopericardial silhouette is within normal limits.  No acute dense focal or segmental consolidation, congestive process, pleural effusions or pneumothorax.    Impression  NO ACUTE CARDIOPULMONARY PROCESS IDENTIFIED.      Electronically signed by: Uziel Aguilar  Date:    01/31/2024  Time:    19:05    No results found for this or any previous visit.    No results found for this or any previous visit.    Subjective     Patient awake and calm.    Patient goals: to eat/drink     Spiritual/Cultural/Taoist Beliefs/Practices that affect care: no  Pain/Comfort: Pain Rating 1: 0/10      Objective:     ORAL MUSCULATURE  Dentition: upper dentures  Secretion Management: adequate  Mucosal Quality: good  Facial Movement: WFL  Buccal Strength & Mobility: WFL  Mandibular Strength & Mobility: WFL  Oral Labial  Strength & Mobility: WFL  Lingual Strength & Mobility: WFL  Velar Elevation: WFL  Vocal Quality: adequate  Volitional Cough: Productive      Consistency Fed By Oral Symptoms Pharyngeal Symptoms   Ice chips Self None None   Puree Self None None   Thin liquid by cup Self None None   Chewable solid Self None None   Thin liquid by straw Self None None     Cough x1 when head of bed elevated.  Cough x1 after evaluation completed as pt repositioning on stretcher.    Assessment:     Oropharyngeal swallowing appears WFL.  Rec: regular solids, thin liquids, meds per patient preference.  Upright for all PO intake.      Goals:     Multidisciplinary Problems       SLP Goals       Not on file                  Patient Education:     Patient provided with verbal education regarding results.  Understanding was verbalized, however additional teaching warranted.    Plan:     SLP Follow-Up:      Patient to be seen:      Plan of Care expires:     Plan of Care reviewed with:  patient      Time Tracking:     SLP Treatment Date:   02/01/24  Speech Start Time:  1125  Speech Stop Time:  1135     Speech Total Time (min):  10 min    Billable minutes:  Swallow and Oral Function Evaluation, 10 minutes     02/01/2024
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
## 2024-02-02 VITALS
BODY MASS INDEX: 20.09 KG/M2 | TEMPERATURE: 98 F | RESPIRATION RATE: 18 BRPM | HEART RATE: 84 BPM | SYSTOLIC BLOOD PRESSURE: 145 MMHG | HEIGHT: 66 IN | WEIGHT: 125 LBS | DIASTOLIC BLOOD PRESSURE: 79 MMHG | OXYGEN SATURATION: 97 %

## 2024-02-02 LAB
ALBUMIN SERPL-MCNC: 3.7 G/DL (ref 3.4–4.8)
ALBUMIN/GLOB SERPL: 1.4 RATIO (ref 1.1–2)
ALP SERPL-CCNC: 74 UNIT/L (ref 40–150)
ALT SERPL-CCNC: 12 UNIT/L (ref 0–55)
AST SERPL-CCNC: 28 UNIT/L (ref 5–34)
BASOPHILS # BLD AUTO: 0.06 X10(3)/MCL
BASOPHILS NFR BLD AUTO: 0.7 %
BILIRUB SERPL-MCNC: 0.7 MG/DL
BUN SERPL-MCNC: 9.8 MG/DL (ref 8.4–25.7)
CALCIUM SERPL-MCNC: 9 MG/DL (ref 8.8–10)
CHLORIDE SERPL-SCNC: 102 MMOL/L (ref 98–107)
CO2 SERPL-SCNC: 22 MMOL/L (ref 23–31)
CREAT SERPL-MCNC: 0.8 MG/DL (ref 0.73–1.18)
EOSINOPHIL # BLD AUTO: 0.23 X10(3)/MCL (ref 0–0.9)
EOSINOPHIL NFR BLD AUTO: 2.5 %
ERYTHROCYTE [DISTWIDTH] IN BLOOD BY AUTOMATED COUNT: 14.2 % (ref 11.5–17)
GFR SERPLBLD CREATININE-BSD FMLA CKD-EPI: >60 MLS/MIN/1.73/M2
GLOBULIN SER-MCNC: 2.6 GM/DL (ref 2.4–3.5)
GLUCOSE SERPL-MCNC: 104 MG/DL (ref 82–115)
HCT VFR BLD AUTO: 38 % (ref 42–52)
HGB BLD-MCNC: 13.1 G/DL (ref 14–18)
IMM GRANULOCYTES # BLD AUTO: 0.03 X10(3)/MCL (ref 0–0.04)
IMM GRANULOCYTES NFR BLD AUTO: 0.3 %
LYMPHOCYTES # BLD AUTO: 1.87 X10(3)/MCL (ref 0.6–4.6)
LYMPHOCYTES NFR BLD AUTO: 20.7 %
MCH RBC QN AUTO: 28.4 PG (ref 27–31)
MCHC RBC AUTO-ENTMCNC: 34.5 G/DL (ref 33–36)
MCV RBC AUTO: 82.3 FL (ref 80–94)
MONOCYTES # BLD AUTO: 0.94 X10(3)/MCL (ref 0.1–1.3)
MONOCYTES NFR BLD AUTO: 10.4 %
NEUTROPHILS # BLD AUTO: 5.92 X10(3)/MCL (ref 2.1–9.2)
NEUTROPHILS NFR BLD AUTO: 65.4 %
NRBC BLD AUTO-RTO: 0 %
PLATELET # BLD AUTO: 281 X10(3)/MCL (ref 130–400)
PMV BLD AUTO: 9.6 FL (ref 7.4–10.4)
POTASSIUM SERPL-SCNC: 4.3 MMOL/L (ref 3.5–5.1)
PROT SERPL-MCNC: 6.3 GM/DL (ref 5.8–7.6)
RBC # BLD AUTO: 4.62 X10(6)/MCL (ref 4.7–6.1)
SODIUM SERPL-SCNC: 133 MMOL/L (ref 136–145)
WBC # SPEC AUTO: 9.05 X10(3)/MCL (ref 4.5–11.5)

## 2024-02-02 PROCEDURE — 25000003 PHARM REV CODE 250

## 2024-02-02 PROCEDURE — 92523 SPEECH SOUND LANG COMPREHEN: CPT

## 2024-02-02 PROCEDURE — 85025 COMPLETE CBC W/AUTO DIFF WBC: CPT

## 2024-02-02 PROCEDURE — 80053 COMPREHEN METABOLIC PANEL: CPT

## 2024-02-02 PROCEDURE — 97162 PT EVAL MOD COMPLEX 30 MIN: CPT

## 2024-02-02 PROCEDURE — 94761 N-INVAS EAR/PLS OXIMETRY MLT: CPT

## 2024-02-02 RX ADMIN — ACETAMINOPHEN 325MG 650 MG: 325 TABLET ORAL at 05:02

## 2024-02-02 RX ADMIN — METHOCARBAMOL 500 MG: 500 TABLET ORAL at 05:02

## 2024-02-02 NOTE — PT/OT/SLP EVAL
Physical Therapy Evaluation and Discharge Note    Patient Name:  Osman Trent   MRN:  [id number]    Recommendations:     Discharge therapy intensity: No Therapy Indicated   Discharge Equipment Recommendations: none   Barriers to discharge: Ongoing medical needs    Assessment:     Osman Trent is a 63 y.o. male admitted with a medical diagnosis of R frontal lob hemorrhagic contusions after being found down with signs of head trauma. Pt's EtOH level 325.0 on admit. Pt independent with mobility without AD but with 3 LOB during dynamic movement due to poor stability and increased speed of movement. Pt with poor safety awareness but also decreased acceptance of education. He declines use of AD and states he will be leaving the hospital, father present at bedside. Pt from [place] but currently staying with father. He states he will be available for assistance as needed. Educated pt on high risk of falls and proper safety awareness. Prior to PT completion of documentation, pt left hospital AMA per nursing notes.    Recent Surgery: * No surgery found *      Plan:     During this hospitalization, patient does not require further acute PT services.  Please re-consult if situation changes.      Subjective     Chief Complaint: ready to leave  Patient/Family Comments/goals: return home  Pain/Comfort:  Pain Rating 1: 0/10  Pain Rating Post-Intervention 1: 0/10    Patients cultural, spiritual, Roman Catholic conflicts given the current situation: no    Living Environment:  Pt resides in [place] but is currently staying with father in [facility],no steps to enter.  Prior to admission, patients level of function was Independent.  Equipment used at home: none.  DME owned (not currently used):  father has cane and RW .  Upon discharge, patient will have assistance from father.    Objective:     Communicated with nurse prior to session.  Patient found up in chair with   upon PT entry to room.    General Precautions: Standard, fall     Orthopedic Precautions:N/A   Braces: N/A  Respiratory Status: Room air  Blood Pressure:     Exams:  Cognitive Exam:  Patient is oriented to Person, Place, and Time  Gross Motor Coordination:  WFL  RLE ROM: WFL  RLE Strength: WFL  LLE ROM: WFL  LLE Strength: WFL    Functional Mobility:  Transfers:     Sit to Stand:  stand by assistance with no AD  Gait: 400ft without AD. Pt with quick jj, step through gait pattern, LOB with changes in direction, able to stabilize with steppage strategy  Balance: Poor dynamic stability with limited awareness of deficits  Tinetti Total Score: 17  < 18 = High Risk of Falls, 19-23 = Moderate Risk of Falls, > 24 = Low Risk of Falls    AM-PAC 6 CLICK MOBILITY  Total Score:24       Treatment and Education:  Educated pt and father on increased risk of fall. Recommended supervision at all times and decreased speed of movement.    Patient and family were provided with verbal education education regarding PT role/goals/POC, fall prevention, and safety awareness.  Understanding was verbalized, however additional teaching warranted.     Patient left up in chair with all lines intact, nurse notified, and father present.    GOALS:   Multidisciplinary Problems       Physical Therapy Goals       Not on file                    History:     No past medical history on file.    No past surgical history on file.    Time Tracking:     PT Received On: 02/02/24  PT Start Time: 0957     PT Stop Time: 1010  PT Total Time (min): 13 min     Billable Minutes: Evaluation moderate      02/02/2024

## 2024-02-02 NOTE — PLAN OF CARE
Pt is anxious for dc. Therapy will see and  will put in dc orders after this if he mobilizes well. Father here to transport home

## 2024-02-02 NOTE — HOSPITAL COURSE
63 year old male who was found down with signs of head trauma. Labs and imaging significant for frontal lobe hemorrhagic contusions, possible nasal bone fracture and elevated alcohol level. He was resuscitated for lactate clearance. Repeat CT head was stable. He was proceeding towards discharge when he left AMA with family.

## 2024-02-02 NOTE — NURSING
"Patient informed of pending discharge orders.Patient's father at bedside and stated that "he (patient) did not feel like waiting for orders, so he left and is walking to the vehicle".  I asked patient's father to please try to get him to come back up to room until discharge orders are put in. I explained the importance of discharge teaching and instructions. I also explained to patient's father that leaving against medical advice could result in more harm and even death.  Patient's father verbalized understanding and left the unit to try to get patient to come back to floor for discharge.   I waited for patient and his father to return for approximately 1 hour until determining that patient has left AMA. Physican notified.   "

## 2024-02-02 NOTE — PT/OT/SLP EVAL
"[facility]  Speech Language Pathology Department  Cognitive-Communication Evaluation    Patient Name:  Osman Trent   MRN:  [id number]    Recommendations:     General recommendations:  cognitive-communication therapy  Communication strategies:  go to room if call light pushed    Discharge therapy intensity: High Intensity Therapy.  At minimum, recommend discharge with 24 hour supervision due to severity of impairments.  Barriers to safe discharge: acuity of illness and limited insight into deficits    History:     Osman Trent is a/n 63 y.o. male admitted after being found down in driveway. +etoh.  CT head revealing frontal lobe hemorrhagic contusion.     No past medical history on file.  No past surgical history on file.    Previous level of Function  Education:high school  Occupation: reports currently unemployed  Lives:  currently living with father  Handed: Right  Glasses: no  Hearing Aids: no    Imaging   No results found for this or any previous visit.    Subjective     Patient awake and calm.    Patient goals: "I'm going home"     Spiritual/Cultural/Protestant Beliefs/Practices that affect care: no  Pain/Comfort: Pain Rating 1: 0/10  Respiratory Status: room air    Objective:     ORAL MUSCULATURE  Dentition: upper dentures  Facial Movement: WFL  Buccal Strength & Mobility: WFL  Mandibular Strength & Mobility: WFL  Oral Labial Strength & Mobility: WFL  Lingual Strength & Mobility: WFL      SPEECH PRODUCTION  Phoneme Production: adequate  Voice Quality: adequate  Voice Production: adequate  Speech Rate: appropriate  Loudness: acceptable  Respiration: WFL for speech  Resonance: adequate  Prosody: adequate  Speech Intelligibility  Known Context: Greater that 90%  Unknown Context: Greater that 90%    AUDITORY COMPREHENSION  Following Directions:  1-Step: 100%  2-Step: 100%  Yes/No Questions:  Biographical: 100%  Environmental: 80%  Simple: 80%  Complex: 100%    VERBAL " "EXPRESSION  Automatic Speech:  Days of the week: Within Functional Limits  Counting: Within Functional Limits    Confrontation Naming  Objects: Within Functional Limits  Wh- Questions:  Object name: Within Functional Limits  Object function: Within Functional Limits    COGNITION  Orientation:  Person: yes  Place: yes  Time: no, 2023  Situation: no, "I don't know"    Attention:  Focused: Within Functional Limits  Pragmatics:  Eye contact: Within Functional Limits  Memory:  Immediate: Impaired (only able to recall 1/4 words)  Long Term: Impaired  Problem Solving  Functional simple: Impaired  Organization:  Convergent thinking: Impaired  Divergent thinking: Impaired  Executive Function:  Attention to detail: Impaired  Cognitive endurance: Impaired    Assessment:     Pt presents with speech abilities WFL however cognitive linguistic impairments are noted impacting safety and independent functioning.  Skilled SLP services warranted to tx impairments.  At minimum, pt will require 24 hour supervision at this time due to poor safety awareness, memory, and problem solving.    Goals:     Multidisciplinary Problems       SLP Goals          Problem: SLP    Goal Priority Disciplines Outcome   SLP Goal     SLP Ongoing, Progressing   Description: LTG:  Pt will improve cognitive linguistic abilities to return to PLOF.    STGs:  1.  Orientation, x4, 100% accuracy, min assist  2.  Attention to detail  3.  Cognitive endurance  4.  Yes/no environmental questions, 100%, min assist  5.  Immediate memory, 100%, min assist  6.  Problem solving, simple, 100%, min assist                     Patient Education:     Patient provided with verbal education regarding results.  Additional teaching is warranted.    Plan:     SLP Follow-Up:  Yes   Patient to be seen:  5 x/week   Plan of Care expires:  02/05/24  Plan of Care reviewed with:  patient      Time Tracking:     SLP Treatment Date:   02/02/24  Speech Start Time:  0905  Speech Stop Time:  " 0920     Speech Total Time (min):  15 min    Billable minutes:  Evaluation of Speech Sound Production with Comprehension and Expression, 15 minutes     02/02/2024

## 2024-02-02 NOTE — DISCHARGE SUMMARY
[facility] Neuro  General Surgery  Discharge Summary      Patient Name: Osman Trent  MRN: [id number]  Admission Date: 1/31/2024  Hospital Length of Stay: 2 days  Discharge Date and Time:  02/02/2024 1:01 PM  Attending Physician: Sushma att. providers found   Discharging Provider: Tamanna Cain [facility]  Primary Care Provider: Sushma, Primary Doctor    HPI:   63 year old male who was found down with signs of head trauma. Labs and imaging significant for frontal lobe hemorrhagic contusions and elevated alcohol level. EM physician discussed with Neurosurgery who advised OK for floor admission with repeat CT head in AM.     * No surgery found *      Indwelling Lines/Drains at time of discharge:   Lines/Drains/Airways       None                 Hospital Course: 63 year old male who was found down with signs of head trauma. Labs and imaging significant for frontal lobe hemorrhagic contusions, possible nasal bone fracture and elevated alcohol level. He was resuscitated for lactate clearance. Repeat CT head was stable. He was proceeding towards discharge when he left AMA with family.     Goals of Care Treatment Preferences:  Code Status: Full Code      Consults:   Consults (From admission, onward)          Status Ordering Provider     Inpatient consult to Neurosurgery  Once        Provider:  Jareth Flanagan MD    Completed QUINTEN TREVINO III     Inpatient consult to Social Work/Case Management  Once        Provider:  (Not yet assigned)    Acknowledged QUINTEN TREVINO III            Significant Diagnostic Studies: Labs: CMP   Recent Labs   Lab 01/31/24 1854 02/01/24  0351 02/02/24  0555   * 131* 133*   K 4.1 4.1 4.3   CO2 23 17* 22*   BUN 7.2* 5.3* 9.8   CREATININE 0.85 0.77 0.80   CALCIUM 9.0 8.8 9.0   ALBUMIN 4.3 4.0 3.7   BILITOT 0.3 0.4 0.7   ALKPHOS 77 80 74   AST 26 36* 28   ALT 13 12 12    and CBC   Recent Labs   Lab 01/31/24 1854 02/01/24  0351 02/02/24  0555   WBC 9.94 12.31*  9.05   HGB 14.2 14.9 13.1*   HCT 44.3 44.8 38.0*    240 281     Radiology:   Imaging Results              CT Head Without Contrast (Final result)  Result time 02/01/24 07:45:07      Final result by Uziel Aguilar MD (02/01/24 07:45:07)                   Impression:    Impression:    1. There is a subcortical hemorrhagic contusion in the right anterior frontal frontal lobe, which measures 10 x 6 x 7 mm (series 2, images 44). Minimal perifocal edema is present. This is stable compared to the prior study.    2. Details and other findings as noted above.    No significant discrepancy with overnight report      Electronically signed by: Uziel Aguilar  Date:    02/01/2024  Time:    07:45               Narrative:      Technique:CT of the head was performed without intravenous contrast with axial as well as coronal and sagittal images.    Comparison:Comparison is with study dated 2024-01-31 19:07:47.    Dosage Information:Automated exposure control was utilized.      Clinical history:Follow up frontal lobe hemorrhagic contusions.    Findings:    Hemorrhage:There is a subcortical hemorrhagic contusion in the right anterior frontal frontal lobe, which measures 10 x 6 x 7 mm (series 2, images 44). Minimal perifocal edema is present. This is stable compared to the prior study.    CSF spaces:The ventricles, sulci and basal cisterns all appear mildly prominent consistent with global cerebral atrophy.    Brain parenchyma:Moderate microvascular change is seen in portions of the periventricular and deep white matter tracts. Similar findings are also seen on the prior examination.    Cerebellum:Unremarkable.    Calvarium:No acute linear or depressed skull fracture is seen.    Scalp:There is moderate right periorbital soft tissue swelling.  No associated underlying bone injury is seen. It's unchanged since the prior examination. Correlate clinically.    Maxillofacial Structures:    Paranasal sinuses:There is some  mucoperiosteal thickening of the maxillary sinuses and ethmoid air cells and sphenoid sinuses.                        Preliminary result by Rojas Alatorre Jr., MD (02/01/24 04:46:01)                   Impression:    1. There is a subcortical hemorrhagic contusion in the right anterior frontal frontal lobe, which measures 10 x 6 x 7 mm (series 2, images 44). Minimal perifocal edema is present. This is stable compared to the prior study.  2. Details and other findings as noted above.               Narrative:    START OF REPORT:  Technique: CT of the head was performed without intravenous contrast with axial as well as coronal and sagittal images.    Comparison: Comparison is with study dated 2024-01-31 19:07:47.    Dosage Information: Automated exposure control was utilized.    Clinical history: Follow up frontal lobe hemorrhagic contusions.    Findings:  Hemorrhage: There is a subcortical hemorrhagic contusion in the right anterior frontal frontal lobe, which measures 10 x 6 x 7 mm (series 2, images 44). Minimal perifocal edema is present. This is stable compared to the prior study.  CSF spaces: The ventricles, sulci and basal cisterns all appear mildly prominent consistent with global cerebral atrophy.  Brain parenchyma: Moderate microvascular change is seen in portions of the periventricular and deep white matter tracts. Similar findings are also seen on the prior examination.  Cerebellum: Unremarkable.  Vascular: Unremarkable venous sinuses. Atheromatous calcification of the intracranial arteries is seen.  Sella and skull base: The sella appears to be within normal limits for age.  Cerebellopontine angles: Within normal limits.  Herniation: None.  Intracranial calcifications: Incidental note is made of bilateral choroid plexus calcification. Incidental note is made of some pineal region calcification.  Calvarium: No acute linear or depressed skull fracture is seen.  Scalp: There is moderate left-side  periorbital soft tissue swelling which measures 1 cm in maximum transverse dimension on Image 16, Series 2. No associated underlying bone injury is seen. It's unchanged since the prior examination. Correlate clinically.    Maxillofacial Structures:  Paranasal sinuses: There is some mucoperiosteal thickening of the maxillary sinuses and ethmoid air cells and sphenoid sinuses. Correlate clinically.  Orbits: The orbits appear unremarkable.  Zygomatic arches: The zygomatic arches are intact and unremarkable.  Temporal bones and mastoids: The temporal bones and mastoids appear unremarkable.  TMJ: The mandibular condyles appear normally placed with respect to the mandibular fossa.  Nasal Bones: No displaced nasal bone fracture is seen.                                         X-Ray Elbow Complete Right (Final result)  Result time 01/31/24 22:40:34      Final result by Uziel Aguilar MD (01/31/24 22:40:34)                   Impression:      No acute osseous abnormality identified.      Electronically signed by: Uziel Aguilar  Date:    01/31/2024  Time:    22:40               Narrative:    EXAMINATION:  Right elbow    CLINICAL HISTORY:  Injury, unspecified, initial encounter    TECHNIQUE:  Three views.    COMPARISON:  None available.    FINDINGS:  Right elbow articular surfaces are unremarkable and there is no intrinsic osseous abnormality.  There is no acute fracture, dislocation or arthritic change.  Position and alignment is satisfactory.  There is unremarkable mineralization of the bones.  No soft tissue calcifications.                        Wet Read by Ge Henriquez III, MD (01/31/24 22:05:50, [facility] - Emergency Dept, Emergency Medicine)    NO FRACTURE NO SUBLUXATION                                     CT Chest Abdomen Pelvis With IV Contrast (XPD) NO Oral Contrast (Final result)  Result time 01/31/24 20:18:11      Final result by Uziel Aguilar MD (01/31/24 20:18:11)                    Impression:      1. No traumatic injury of the thorax, abdomen or pelvis identified.    2.  Details of the findings above.      Electronically signed by: Uziel Aguilar  Date:    01/31/2024  Time:    20:18               Narrative:    EXAMINATION:  CT CHEST ABDOMEN PELVIS WITH IV CONTRAST (XPD)    CLINICAL HISTORY:  Trauma;    TECHNIQUE:  Multidetector axial images were obtained from the thoracic inlet through the greater trochanters following the administration of IV contrast.    Dose length product of 526 mGycm. Automated exposure control was utilized to minimize radiation dose.    COMPARISON:  None available.    CHEST FINDINGS:    Bilateral upper lung lobes are remarkable for emphysematous changes.  There is large bullous formation left upper lung lobe adjacent to the anterior junction line on image 38 series 5.  No acute airspace consolidation, contusion or pneumothorax.  No fluid within the pleural or the pericardial spaces.    Images are partially degraded by respiratory misregistration. No traumatic finding of the thoracic great vessels identified and there are no dominant mediastinal hematomas. Thoracic spine alignment is preserved. No consistent findings reflective of a displaced fracture.    ABDOMINAL FINDINGS:    There is no abdominal solid parenchymal organs traumatic damage with unremarkable attenuation of the liver, pancreas and spleen. Gallbladder wall is not thickened and there is no intra luminal calcified calculus.    Right adrenal gland is unremarkable.  There is 1.5 cm nodular enlargement left adrenal gland on image 107 series 3 which may represent an adenoma.  Kidneys are symmetric in size and exhibit symmetric contrast enhancement. No renal contusion or laceration identified. There is no hydronephrosis or perinephric fluid collection.  Dense calcified plaques of the abdominal aorta and the iliac arteries.  No retroperitoneal hematoma. There is no extra luminal air. No focal bowel wall thickening  or free fluid identified. Lumbar alignment is preserved.    PELVIC FINDINGS:    There is no free fluid. Urinary bladder appears within normal limits without wall thickening. No evidence for bladder rupture.  Prostate is enlarged in size and contains dystrophic calcifications.  Femoral heads are well situated within their respective acetabula. Pubic symphysis and SI joints are intact. No pelvic fracture identified.                                       CT Maxillofacial Without Contrast (Final result)  Result time 01/31/24 20:08:27      Final result by Uziel Aguilar MD (01/31/24 20:08:27)                   Impression:      1. Right periorbital soft tissue inflammations/laceration.    2. No acute fracture of the orbital walls identified.    3. Focal fracture deformity of the right nasal bone.      Electronically signed by: Uziel Aguilar  Date:    01/31/2024  Time:    20:08               Narrative:    EXAMINATION:  CT MAXILLOFACIAL WITHOUT CONTRAST    CLINICAL HISTORY:  Facial trauma, blunt;    TECHNIQUE:  Multidetector axial images were performed maxillofacial without contrast and images reformatted.    Dose length product of 1694 mGycm. Automated exposure control was utilized to minimize radiation dose.    COMPARISON:  None available    FINDINGS:  There are right periorbital soft tissue inflammations with small associated emphysema on image 53 series 3.  There are no fractures of the orbital walls. The globes are unremarkable and no intra-orbital inflammations or emphysema identified.    There is fracture deformity of the right nasal bone on image 47 series 7 and there are adjacent mild perinasal inflammatory changes.  No fractures of the pterygoids, zygomatic arches, paranasal sinuses walls or the mandibles.    There is bilateral mucoperiosteal thickening of the maxillary sinuses and ethmoid air cells.  The nasal septum is deviated towards the right.  Bilateral middle turbinates leonila bullosa.                                        CT Cervical Spine Without Contrast (Final result)  Result time 01/31/24 20:11:29      Final result by Uziel Aguilar MD (01/31/24 20:11:29)                   Impression:      No acute fracture or malalignment identified.    Degenerative changes.      Electronically signed by: Uziel Aguilar  Date:    01/31/2024  Time:    20:11               Narrative:    EXAMINATION:  CT CERVICAL SPINE WITHOUT CONTRAST    CLINICAL HISTORY:  Trauma.    TECHNIQUE:  Multidetector axial images were performed of the cervical spine without and.  Images were reconstructed.    Automated exposure control was utilized to minimize radiation dose.  DLP 1694.    COMPARISON:  None available.    FINDINGS:  Cervical vertebrae stature is maintained and alignment is unremarkable.  No acute fracture or malalignment identified.  There are degenerative changes at C5-C6 which cause severe narrowing of the left neural foramen.  At C6-C7, there is also marked narrowing left neural foramen..  There is no prevertebral soft tissue prominence.    This study does not exclude the possibility of intrathecal soft tissue, ligamentous or vascular injury.                                       CT Head Without Contrast (Final result)  Result time 01/31/24 20:18:36      Final result by Uziel Aguilar MD (01/31/24 20:18:36)                   Impression:      Right frontal lobe hemorrhagic contusion.    Findings were notified to Dr. Fatimah Paula January 31, 2024 at 20:18 hours      Electronically signed by: Uziel Aguilar  Date:    01/31/2024  Time:    20:18               Narrative:    EXAMINATION:  CT HEAD WITHOUT CONTRAST    CLINICAL HISTORY:  Trauma;    TECHNIQUE:  Sequential axial images were performed of the brain without contrast.    Dose product length of 1694 mGycm. Automated exposure control was utilized to minimize radiation dose.    COMPARISON:  None available.    FINDINGS:  There is right high frontal lobe bilobed hyperdense hemorrhagic  contusion measuring 9.5 mm on image 54 series 4..  There is no sulcal effacement or low attenuation changes to suggest recent large vessel territory infarction. Chronic appearing periventricular and subcortical white matter low attenuation changes are present and are consistent with marked chronic microangiopathic ischemia. The ventricular system and sulcal markings prominence is consistent with atrophy. There is no acute extra axial fluid collection.  There is no acute depressed skull fracture.    There are right periorbital soft inflammations.  Maxillofacial findings are described on separate report.                                       X-Ray Pelvis Routine AP (Final result)  Result time 01/31/24 19:06:13      Final result by Uziel Aguilar MD (01/31/24 19:06:13)                   Impression:      No acute osseous abnormality identified.      Electronically signed by: Uziel Aguilar  Date:    01/31/2024  Time:    19:06               Narrative:    EXAMINATION:  Pelvis XR PELVIS ROUTINE AP    CLINICAL HISTORY:  Trauma.    TECHNIQUE:  One view    COMPARISON:  None available.    FINDINGS:  Articular surfaces alignment is preserved and there is no intrinsic osseous abnormality.  There is partial overlapping of the left femoral neck.  No acute fracture, dislocation or arthritic change.  Position and alignment is satisfactory.                                       X-Ray Chest 1 View (Final result)  Result time 01/31/24 19:05:18      Final result by Uziel Aguilar MD (01/31/24 19:05:18)                   Impression:      NO ACUTE CARDIOPULMONARY PROCESS IDENTIFIED.      Electronically signed by: Uziel Aguilar  Date:    01/31/2024  Time:    19:05               Narrative:    EXAMINATION:  XR CHEST 1 VIEW    CLINICAL HISTORY:  r/o bleeding or hemorrhage;    TECHNIQUE:  One view    COMPARISON:  None available.    FINDINGS:  Cardiopericardial silhouette is within normal limits.  No acute dense focal or segmental  consolidation, congestive process, pleural effusions or pneumothorax.                                        Pending Diagnostic Studies:       None          Final Active Diagnoses:    Diagnosis Date Noted POA    PRINCIPAL PROBLEM:  Contusion of cerebral cortex with loss of consciousness [S06.2X9A] 02/01/2024 Yes    Fall from standing [W19.XXXA] 02/01/2024 Yes    Closed fracture of nasal bone [S02.2XXA] 02/01/2024 Yes    Orbital contusion [S05.10XA] 02/01/2024 Yes    ETOH abuse [F10.10] 02/01/2024 Yes      Problems Resolved During this Admission:      Discharged Condition: against medical advice    Disposition:  AMA    Follow Up:    Patient Instructions:   No discharge procedures on file.  Medications:  None  Time spent on the discharge of patient: 15 minutes    LILIANA Morel-BC  General Surgery  [facility] - [facility]o Neuro

## 2024-02-02 NOTE — PLAN OF CARE
Problem: Adult Inpatient Plan of Care  Goal: Plan of Care Review  Outcome: Ongoing, Progressing  Flowsheets (Taken 2/1/2024 2100)  Plan of Care Reviewed With: patient  Goal: Optimal Comfort and Wellbeing  Outcome: Ongoing, Progressing  Intervention: Monitor Pain and Promote Comfort  Flowsheets (Taken 2/1/2024 2100)  Pain Management Interventions:   pillow support provided   quiet environment facilitated   relaxation techniques promoted   pain management plan reviewed with patient/caregiver   medication offered  Intervention: Provide Person-Centered Care  Flowsheets (Taken 2/1/2024 2100)  Trust Relationship/Rapport:   care explained   choices provided   questions encouraged   reassurance provided   emotional support provided   thoughts/feelings acknowledged   empathic listening provided   questions answered

## 2024-02-02 NOTE — PLAN OF CARE
Problem: SLP  Goal: SLP Goal  Description: LTG:  Pt will improve cognitive linguistic abilities to return to PLOF.    STGs:  1.  Orientation, x4, 100% accuracy, min assist  2.  Attention to detail  3.  Cognitive endurance  4.  Yes/no environmental questions, 100%, min assist  5.  Immediate memory, 100%, min assist  6.  Problem solving, simple, 100%, min assist  Outcome: Ongoing, Progressing     POC initiated and goals created.  Hoda

## 2024-10-02 ENCOUNTER — TELEPHONE (OUTPATIENT)
Dept: PRIMARY CARE CLINIC | Facility: CLINIC | Age: 64
End: 2024-10-02
Payer: MEDICARE